# Patient Record
Sex: MALE | Race: WHITE
[De-identification: names, ages, dates, MRNs, and addresses within clinical notes are randomized per-mention and may not be internally consistent; named-entity substitution may affect disease eponyms.]

---

## 2018-06-29 ENCOUNTER — HOSPITAL ENCOUNTER (OUTPATIENT)
Dept: HOSPITAL 89 - LAB | Age: 78
End: 2018-06-29
Attending: INTERNAL MEDICINE
Payer: MEDICARE

## 2018-06-29 VITALS — BODY MASS INDEX: 38.1 KG/M2

## 2018-06-29 DIAGNOSIS — I10: Primary | ICD-10-CM

## 2018-06-29 DIAGNOSIS — E11.40: ICD-10-CM

## 2018-06-29 DIAGNOSIS — E78.2: ICD-10-CM

## 2018-06-29 LAB — LDLC SERPL-MCNC: 25 MG/DL

## 2018-06-29 PROCEDURE — 82947 ASSAY GLUCOSE BLOOD QUANT: CPT

## 2018-06-29 PROCEDURE — 82565 ASSAY OF CREATININE: CPT

## 2018-06-29 PROCEDURE — 82465 ASSAY BLD/SERUM CHOLESTEROL: CPT

## 2018-06-29 PROCEDURE — 84155 ASSAY OF PROTEIN SERUM: CPT

## 2018-06-29 PROCEDURE — 82374 ASSAY BLOOD CARBON DIOXIDE: CPT

## 2018-06-29 PROCEDURE — 82040 ASSAY OF SERUM ALBUMIN: CPT

## 2018-06-29 PROCEDURE — 84075 ASSAY ALKALINE PHOSPHATASE: CPT

## 2018-06-29 PROCEDURE — 84295 ASSAY OF SERUM SODIUM: CPT

## 2018-06-29 PROCEDURE — 83036 HEMOGLOBIN GLYCOSYLATED A1C: CPT

## 2018-06-29 PROCEDURE — 82435 ASSAY OF BLOOD CHLORIDE: CPT

## 2018-06-29 PROCEDURE — 84520 ASSAY OF UREA NITROGEN: CPT

## 2018-06-29 PROCEDURE — 84460 ALANINE AMINO (ALT) (SGPT): CPT

## 2018-06-29 PROCEDURE — 36415 COLL VENOUS BLD VENIPUNCTURE: CPT

## 2018-06-29 PROCEDURE — 82310 ASSAY OF CALCIUM: CPT

## 2018-06-29 PROCEDURE — 84478 ASSAY OF TRIGLYCERIDES: CPT

## 2018-06-29 PROCEDURE — 84450 TRANSFERASE (AST) (SGOT): CPT

## 2018-06-29 PROCEDURE — 84132 ASSAY OF SERUM POTASSIUM: CPT

## 2018-06-29 PROCEDURE — 82247 BILIRUBIN TOTAL: CPT

## 2018-06-29 PROCEDURE — 83718 ASSAY OF LIPOPROTEIN: CPT

## 2018-09-06 ENCOUNTER — HOSPITAL ENCOUNTER (OUTPATIENT)
Dept: HOSPITAL 89 - LAB | Age: 78
End: 2018-09-06
Attending: INTERNAL MEDICINE
Payer: MEDICARE

## 2018-09-06 ENCOUNTER — HOSPITAL ENCOUNTER (OUTPATIENT)
Dept: HOSPITAL 89 - LAB | Age: 78
End: 2018-09-06
Attending: NURSE PRACTITIONER
Payer: MEDICARE

## 2018-09-06 VITALS — BODY MASS INDEX: 38.1 KG/M2 | BODY MASS INDEX: 38.1 KG/M2

## 2018-09-06 DIAGNOSIS — R06.01: Primary | ICD-10-CM

## 2018-09-06 DIAGNOSIS — R79.89: ICD-10-CM

## 2018-09-06 DIAGNOSIS — Z79.899: ICD-10-CM

## 2018-09-06 DIAGNOSIS — R06.02: ICD-10-CM

## 2018-09-06 DIAGNOSIS — M05.79: Primary | ICD-10-CM

## 2018-09-06 LAB — PLATELET COUNT, AUTOMATED: 133 K/UL (ref 150–450)

## 2018-09-06 PROCEDURE — 84155 ASSAY OF PROTEIN SERUM: CPT

## 2018-09-06 PROCEDURE — 84520 ASSAY OF UREA NITROGEN: CPT

## 2018-09-06 PROCEDURE — 82565 ASSAY OF CREATININE: CPT

## 2018-09-06 PROCEDURE — 84295 ASSAY OF SERUM SODIUM: CPT

## 2018-09-06 PROCEDURE — 82947 ASSAY GLUCOSE BLOOD QUANT: CPT

## 2018-09-06 PROCEDURE — 82374 ASSAY BLOOD CARBON DIOXIDE: CPT

## 2018-09-06 PROCEDURE — 82247 BILIRUBIN TOTAL: CPT

## 2018-09-06 PROCEDURE — 82310 ASSAY OF CALCIUM: CPT

## 2018-09-06 PROCEDURE — 85025 COMPLETE CBC W/AUTO DIFF WBC: CPT

## 2018-09-06 PROCEDURE — 83880 ASSAY OF NATRIURETIC PEPTIDE: CPT

## 2018-09-06 PROCEDURE — 85651 RBC SED RATE NONAUTOMATED: CPT

## 2018-09-06 PROCEDURE — 82248 BILIRUBIN DIRECT: CPT

## 2018-09-06 PROCEDURE — 36415 COLL VENOUS BLD VENIPUNCTURE: CPT

## 2018-09-06 PROCEDURE — 84450 TRANSFERASE (AST) (SGOT): CPT

## 2018-09-06 PROCEDURE — 84075 ASSAY ALKALINE PHOSPHATASE: CPT

## 2018-09-06 PROCEDURE — 84132 ASSAY OF SERUM POTASSIUM: CPT

## 2018-09-06 PROCEDURE — 84460 ALANINE AMINO (ALT) (SGPT): CPT

## 2018-09-06 PROCEDURE — 82040 ASSAY OF SERUM ALBUMIN: CPT

## 2018-09-06 PROCEDURE — 82435 ASSAY OF BLOOD CHLORIDE: CPT

## 2018-10-16 ENCOUNTER — HOSPITAL ENCOUNTER (OUTPATIENT)
Dept: HOSPITAL 89 - NUC | Age: 78
End: 2018-10-16
Attending: INTERNAL MEDICINE
Payer: MEDICARE

## 2018-10-16 VITALS — BODY MASS INDEX: 38.1 KG/M2

## 2018-10-16 DIAGNOSIS — I25.810: Primary | ICD-10-CM

## 2018-10-16 PROCEDURE — 78452 HT MUSCLE IMAGE SPECT MULT: CPT

## 2018-10-16 PROCEDURE — 93017 CV STRESS TEST TRACING ONLY: CPT

## 2018-10-16 NOTE — RT STRESS TEST REPORT
FACILITY: [facility] 

 

PATIENT NAME: JACOB GRACIA

: [id number]

MR: [id number]

V: [id number]

EXAM DATE: 

ORDERING PHYSICIAN: NO OTHER

TECHNOLOGIST: Paulina

 

Acquisition Time: 2018-10-16  14:38:34

Total Exercise Time: 00:01:00

Test Indications: Screening for CAD

Medications: Seec nuc med sheet 

              

              

              

              

              

              

              

              

              

Protocol: LEXISCAN        

Max HR: 085 BPM  59% of  Pred: 144 BPM

Max BP: 138/058 mmHG

Max Work Load: 1.0 METS

 

 

Confirmed by COLLIN CLIFFORD ([id number]) on 10/16/2018 10:18:53 PM

 

Referred By:             Overread By: COLLIN CLIFFORD

## 2018-10-17 NOTE — RADIOLOGY IMAGING REPORT
FACILITY: [facility] 

 

PATIENT NAME: Krystian Franklin

: 1940

MR: [id number]

V: [id number]

EXAM DATE: 

ORDERING PHYSICIAN: LINA CALHOUN

TECHNOLOGIST: 

 

[place] [facility] [place]

Patient: Krystian Franklin

: 1940

MRN: [id number]

Visit/Account:[id number]

Date of Sevice: 10/16/2018

 

ACCESSION #: [id number]

 

EXAMINATION:  Single isotope SPECT imaging with regadenoson infusion and gated SPECT imaging.

 

DATE OF EXAMINATION:  10/16/2018.

 

DATE OF INTERPRETATION:  10/17/2018.

 

REQUESTING PHYSICIAN:  LINA CALHOUN.

 

INDICATION:  The patient is a 77-year-old male evaluated for CAD.

 

PROCEDURE:  After informed consent the patient received an intravenous injection of 12.8 mCi of Tc-99
m sestamibi followed at an appropriate time interval by rest imaging.  The patient then subsequently 
received an intravenous infusion of 0.4 mg of regadenoson per protocol without complication.  Resting
 heart rate was 73 bpm with a peak heart rate of 85 bpm.  Blood pressure at rest was 135 / 74 and fol
lowing infusion was 138 / 58.  Baseline EKG demonstrates sinus rhythm with PVCs.  There were no EKG c
hanges of ischemia following infusion.  Symptoms were nonspecific.  The patient then received an intr
avenous injection of 31.0 mCi of Tc-99m sestamibi followed by stress imaging.

 

RAW DATA:  Examination of the summed raw data revealed a good quality study.

 

MYOCARDIAL PERFUSION:  The tomographic images demonstrate normal myocardial perfusion with no evidenc
e of infarct or ischemia. There is no TID.

 

GATED IMAGES:  The gated images demonstrate mildly diminished ejection fraction 47%.

 

IMPRESSION:

1.  Nondiagnostic Lexiscan stress ECG

2.  Normal myocardial perfusion scan.

3.  Mildly decreased LV systolic function; LVEF 47%.

4. Based on the results of this exam, the patient appears to be at low risk for future cardiovascular
 events.

 

Report Dictated By: Mark Renteria at 10/17/2018 12:38 PM

 

Report E-Signed By: Mark Renteria  at 10/17/2018 12:41 PM

 

WSN:[id number]

## 2019-01-10 ENCOUNTER — HOSPITAL ENCOUNTER (OUTPATIENT)
Dept: HOSPITAL 89 - LAB | Age: 79
End: 2019-01-10
Attending: INTERNAL MEDICINE
Payer: MEDICARE

## 2019-01-10 VITALS — BODY MASS INDEX: 38.1 KG/M2

## 2019-01-10 DIAGNOSIS — E11.40: Primary | ICD-10-CM

## 2019-01-10 DIAGNOSIS — R06.02: ICD-10-CM

## 2019-01-10 DIAGNOSIS — R60.0: ICD-10-CM

## 2019-01-10 DIAGNOSIS — I10: ICD-10-CM

## 2019-01-10 DIAGNOSIS — E78.2: ICD-10-CM

## 2019-01-10 DIAGNOSIS — Z95.1: ICD-10-CM

## 2019-01-10 LAB — LDLC SERPL-MCNC: 8 MG/DL

## 2019-01-10 PROCEDURE — 82374 ASSAY BLOOD CARBON DIOXIDE: CPT

## 2019-01-10 PROCEDURE — 36415 COLL VENOUS BLD VENIPUNCTURE: CPT

## 2019-01-10 PROCEDURE — 84155 ASSAY OF PROTEIN SERUM: CPT

## 2019-01-10 PROCEDURE — 83880 ASSAY OF NATRIURETIC PEPTIDE: CPT

## 2019-01-10 PROCEDURE — 82947 ASSAY GLUCOSE BLOOD QUANT: CPT

## 2019-01-10 PROCEDURE — 84478 ASSAY OF TRIGLYCERIDES: CPT

## 2019-01-10 PROCEDURE — 82310 ASSAY OF CALCIUM: CPT

## 2019-01-10 PROCEDURE — 84132 ASSAY OF SERUM POTASSIUM: CPT

## 2019-01-10 PROCEDURE — 84450 TRANSFERASE (AST) (SGOT): CPT

## 2019-01-10 PROCEDURE — 82247 BILIRUBIN TOTAL: CPT

## 2019-01-10 PROCEDURE — 82465 ASSAY BLD/SERUM CHOLESTEROL: CPT

## 2019-01-10 PROCEDURE — 82565 ASSAY OF CREATININE: CPT

## 2019-01-10 PROCEDURE — 83718 ASSAY OF LIPOPROTEIN: CPT

## 2019-01-10 PROCEDURE — 82435 ASSAY OF BLOOD CHLORIDE: CPT

## 2019-01-10 PROCEDURE — 84460 ALANINE AMINO (ALT) (SGPT): CPT

## 2019-01-10 PROCEDURE — 82040 ASSAY OF SERUM ALBUMIN: CPT

## 2019-01-10 PROCEDURE — 84075 ASSAY ALKALINE PHOSPHATASE: CPT

## 2019-01-10 PROCEDURE — 84295 ASSAY OF SERUM SODIUM: CPT

## 2019-01-10 PROCEDURE — 83036 HEMOGLOBIN GLYCOSYLATED A1C: CPT

## 2019-01-10 PROCEDURE — 84520 ASSAY OF UREA NITROGEN: CPT

## 2019-02-12 ENCOUNTER — HOSPITAL ENCOUNTER (EMERGENCY)
Dept: HOSPITAL 89 - ER | Age: 79
LOS: 1 days | Discharge: ANOTHER HOSPITAL | End: 2019-02-13
Payer: MEDICARE

## 2019-02-12 ENCOUNTER — HOSPITAL ENCOUNTER (OUTPATIENT)
Dept: HOSPITAL 89 - AMB | Age: 79
End: 2019-02-12
Payer: MEDICARE

## 2019-02-12 VITALS — WEIGHT: 265 LBS | BODY MASS INDEX: 38.1 KG/M2

## 2019-02-12 VITALS — BODY MASS INDEX: 38.1 KG/M2

## 2019-02-12 DIAGNOSIS — M79.661: ICD-10-CM

## 2019-02-12 DIAGNOSIS — R10.30: ICD-10-CM

## 2019-02-12 DIAGNOSIS — M79.662: ICD-10-CM

## 2019-02-12 DIAGNOSIS — R53.1: ICD-10-CM

## 2019-02-12 DIAGNOSIS — E11.40: ICD-10-CM

## 2019-02-12 DIAGNOSIS — K63.89: Primary | ICD-10-CM

## 2019-02-12 DIAGNOSIS — R60.9: ICD-10-CM

## 2019-02-12 DIAGNOSIS — K92.2: Primary | ICD-10-CM

## 2019-02-12 DIAGNOSIS — I10: ICD-10-CM

## 2019-02-12 DIAGNOSIS — Z79.4: ICD-10-CM

## 2019-02-12 LAB — PLATELET COUNT, AUTOMATED: 171 K/UL (ref 150–450)

## 2019-02-12 PROCEDURE — 84450 TRANSFERASE (AST) (SGOT): CPT

## 2019-02-12 PROCEDURE — 82435 ASSAY OF BLOOD CHLORIDE: CPT

## 2019-02-12 PROCEDURE — 99291 CRITICAL CARE FIRST HOUR: CPT

## 2019-02-12 PROCEDURE — 82040 ASSAY OF SERUM ALBUMIN: CPT

## 2019-02-12 PROCEDURE — 84520 ASSAY OF UREA NITROGEN: CPT

## 2019-02-12 PROCEDURE — 85025 COMPLETE CBC W/AUTO DIFF WBC: CPT

## 2019-02-12 PROCEDURE — 84132 ASSAY OF SERUM POTASSIUM: CPT

## 2019-02-12 PROCEDURE — 83690 ASSAY OF LIPASE: CPT

## 2019-02-12 PROCEDURE — 83605 ASSAY OF LACTIC ACID: CPT

## 2019-02-12 PROCEDURE — 84075 ASSAY ALKALINE PHOSPHATASE: CPT

## 2019-02-12 PROCEDURE — 82150 ASSAY OF AMYLASE: CPT

## 2019-02-12 PROCEDURE — 74177 CT ABD & PELVIS W/CONTRAST: CPT

## 2019-02-12 PROCEDURE — 96361 HYDRATE IV INFUSION ADD-ON: CPT

## 2019-02-12 PROCEDURE — 84460 ALANINE AMINO (ALT) (SGPT): CPT

## 2019-02-12 PROCEDURE — 84155 ASSAY OF PROTEIN SERUM: CPT

## 2019-02-12 PROCEDURE — 82565 ASSAY OF CREATININE: CPT

## 2019-02-12 PROCEDURE — 82947 ASSAY GLUCOSE BLOOD QUANT: CPT

## 2019-02-12 PROCEDURE — 96375 TX/PRO/DX INJ NEW DRUG ADDON: CPT

## 2019-02-12 PROCEDURE — 82374 ASSAY BLOOD CARBON DIOXIDE: CPT

## 2019-02-12 PROCEDURE — 96374 THER/PROPH/DIAG INJ IV PUSH: CPT

## 2019-02-12 PROCEDURE — 71045 X-RAY EXAM CHEST 1 VIEW: CPT

## 2019-02-12 PROCEDURE — 82310 ASSAY OF CALCIUM: CPT

## 2019-02-12 PROCEDURE — 82247 BILIRUBIN TOTAL: CPT

## 2019-02-12 PROCEDURE — 81001 URINALYSIS AUTO W/SCOPE: CPT

## 2019-02-12 PROCEDURE — 84295 ASSAY OF SERUM SODIUM: CPT

## 2019-02-12 NOTE — RADIOLOGY IMAGING REPORT
FACILITY: [facility] 

 

PATIENT NAME: Krystian Franklin

: 1940

MR: [id number]

V: [id number]

EXAM DATE: 

ORDERING PHYSICIAN: VINOD LEMA

TECHNOLOGIST: 

 

Location: [facility] [place]

Patient: Krystian Franklin

: 1940

MRN: [id number]

Visit/Account:[id number]

Date of Sevice:  2019

 

ACCESSION #: [id number]

 

 

COMPUTED TOMOGRAPHY ABDOMEN AND PELVIS WITH INTRAVENOUS CONTRAST

 

DATE OF EXAM: 2019 8:22 PM

 

INDICATION: Severe abdominal pain and bloating.

 

COMPARISON: 2017 and previous.

 

TECHNIQUE: Contrast enhanced abdomen and pelvis CT performed during the injection of 95 ml of Isovue 
300.  Sagittal and coronal reconstructions were performed.  One of the following dose optimization te
chniques was utilized in the performance of this exam: Automated exposure control; adjustment of the 
mA and/or kV according to the patient's size; or use of an iterative  reconstruction technique.  Spec
[facility] details can be referenced in the facility's radiology CT exam operational policy.

 

FINDINGS:

 

Lung bases: Mild scarring/atelectasis.  Coronary artery calcification.

 

Liver and hepatic vasculature:  There is a small amount of scattered gas within the liver likely in t
he portal venous system.  Calcified granuloma noted in the right lobe.

 

Gallbladder and bile ducts:  Cholecystectomy.  Ducts are unremarkable.

 

Spleen:  Multiple calcified granulomas.

 

Pancreas:  Vague hypoattenuating region in the tail of the pancreas is similar in appearance 2016, po
ssibly representing underlying cyst.

 

Adrenals:  Normal.

 

Kidneys, ureters and bladder:  Nonacute.  Probable tiny left renal cysts.

 

Retroperitoneum and aorta:  Nonaneurysmal aorta with mild to moderate calcification.  No adenopathy.

 

GI tract, mesentery and peritoneum:  The gastric pylorus and proximal duodenum appear thick-walled.  
There is extensive pneumatosis involving the cecum and proximal ascending colon with a small volume o
f adjacent pneumoperitoneum.  There is also a small volume of pneumoperitoneum in the upper abdomen.

 

There is a small bowel anastomosis in the right lower quadrant that is partially contained within a s
mall right lower quadrant hernia.  The portion associated with the hernia also appears to have some p
neumatosis.  There is mild gaseous distention of the remainder of the colon.  No definite evidence of
 obstruction.  Additional loops of small bowel appear to be adherent to the anterior abdominal wall s
ubjacent to ventral scar.

 

Prostate and seminal vesicles: Normal.

 

Bones and soft tissues:  Ventral surgical scars.  There are at least 2 fat-containing ventral hernias
 in the upper abdomen 2.  Small right lower quadrant hernia partially containing a small bowel anasto
mosis as above.  No suspicious or acute bone lesion.  Remote compression deformity of L2.

 

 

IMPRESSION:

 

1.  Extensive pneumatosis of the cecum and proximal ascending colon, with a small volume of pneumoper
itoneum and portal venous gas.  Although of uncertain etiology, this could indicate ischemic or necro
tic bowel, possibly with a microperforation.  Recommend urgent surgical consultation.

 

2.  Small bowel anastomosis in the right lower quadrant partially contained within a small abdominal 
wall hernia.  The wall of the anastomosis involved with the hernia appears to have some pneumatosis a
s well.

 

3.  Gastric pylorus proximal duodenum appear thick-walled which may be infectious or inflammatory; ne
oplasm not excluded.  This could be further evaluated with endoscopy if indicated.

 

4.  Hypoattenuating lesion in the tail of the pancreas is similar in appearance 2016, possibly repres
enting underlying cyst.  Consider 1-2 year follow-up.

 

Dr. Solano discussed this case with VINOD LEMA on 2019 10:02 PM.

 

Report Dictated By: Yusef Solano MD at 2019 9:42 PM

 

Report E-Signed By: Yusef Solano MD  at 2019 10:02 PM

 

WSN:[id number]

## 2019-02-12 NOTE — ER REPORT
History and Physical


Time Seen By MD:  20:11


HPI/ROS


CHIEF COMPLAINT: Abdominal pain and bloating





HISTORY OF PRESENT ILLNESS: 78-year-old male presents ambulatory to the ER 


complaining of increased abdominal pain for 2 days.  Normally has chronic 


diarrhea and abdominal bloating.  He's had numerous abdominal surgeries.  The 


patient feels like he is going to explode.  He notes increased abdominal 


distention and nausea for the last 2 days.  She notes no fever or chills.  


Patient notes no vomiting.  He notes his ventral hernia seems firmer than usual 


and enlarged





REVIEW OF SYSTEMS:


Respiratory: No cough, no dyspnea.


Cardiovascular: No chest pain, no palpitations.


Gastrointestinal: As above


Musculoskeletal: No back pain.


Allergies:  


Coded Allergies:  


     Penicillins (Verified  Allergy, Severe, HIVES, 11/13/16)


     Quinolones (Verified  Allergy, Severe, TENDONITIS, 11/13/16)


     levofloxacin (Verified  Allergy, Severe,  , 11/13/16)


   


   TENDONITIS


     adhesive (Verified  Allergy, Mild, RASH, 11/13/16)


     cefaclor (Verified  Adverse Reaction, Mild, ITCHING, 11/13/16)


     morphine (Verified  Adverse Reaction, Mild, SEDATION, 11/13/16)


Home Meds


Active Scripts


Albuterol Sulfate (VENTOLIN HFA) 18 Gm Inh, 2 PUFF INH Q4-6H PRN for shortness 


of breath, #3 INH 3 Refills


   Prov:HUBER SWANSON MD         1/10/19


Albuterol Sulfate 0.083% (ALBUTEROL SULFATE 0.083%) 2.5 Mg/3 Ml Vial.neb, 2.5 MG


INH Q6H for wheezing/cough, #1 BOX 3 Refills


   Prov:HUBER SWANSON MD         12/26/18


Mometasone Furoate (NASONEX) 17 Gm [place], 2 SPR MADI QDAY, #1 SPRAY 3 Refills


   Prov:HUBER SWANSON MD         12/20/18


Pantoprazole Sodium (PANTOPRAZOLE SODIUM) 40 Mg Tablet.dr, 1 TAB PO QDAY, #90 


TAB.SR 1 Refill


   Prov:HUBER SWANSON MD         12/20/18


Lisinopril (LISINOPRIL) 5 Mg Tablet, 1 TAB PO BID, #90 TAB 3 Refills


   Prov:HUBER SWANSON MD         12/20/18


Blood Sugar Diagnostic, Drum (Accu-Chek Compact Plus) 1 Each Strip, 300 EA MC 


every ninety days, #300 EA 4 Refills


   Use to test blood suger TID


   Prov:HUBER SWANSON MD         11/1/18


Clobetasol Propionate (CLOBETASOL PROPIONATE) 15 Gm Oint...g., 1 SINDHU TP BID, #1 


TUBE 0 Refills


   Prov:HUBER SWANSON MD         9/13/18


Glipizide (GLIPIZIDE) 10 Mg Tablet, 1 TAB PO BID, #180 TAB 3 Refills


   Prov:HUBER SWANSON MD         9/7/18


Nph, Human Insulin Isophane (NOVOLIN N) 100 Unit/1 Ml Vial, 23 UNIT SQ BIDAC, #2


VIAL 5 Refills


   Prov:HUBER SWANSON MD         7/10/18


Leflunomide (LEFLUNOMIDE) 20 Mg Tablet, 1 TAB PO QDAY, #90 TAB 1 Refill


   Prov:CORY ARREDONDO MD         3/14/18


Metoprolol Succinate (METOPROLOL SUCCINATE) 25 Mg Tab.er.24h, 0.5 TAB PO BID, 


#90 TAB 4 Refills


   Prov:HUBER SWANSON MD         11/29/17


Syringe & Needle,Insulin,1 Ml (INSULIN SYRINGE) 1 Each Disp.syrin, 1 EACH MC 


[id number], #300 4 Refills


   use 3 syringes to inject insulin 3 times daily.


   Prov:HUBER SWANSON MD         11/29/17


Montelukast Sodium (SINGULAIR) 10 Mg Tablet, 1 TAB PO QDAY, #30 TAB 5 Refills


   Prov:HUBER SWANSON MD         5/31/17


Reported Medications


Loperamide Hcl (LOPERAMIDE) 2 Mg Tablet, 2 MG PO


   2/12/19


Fluticasone Prop 50 Mcg Ns (FLONASE 50 MCG NS) 16 Gm Spray.susp, 2 SPRAYS NS 


QDAY, BOT


   2/12/19


Prednisone (PREDNISONE) 20 Mg Tablet, 20 MG PO BID, TAB


   2/12/19


Cholecalciferol (Vitamin D3) (VITAMIN D3) 1,000 Unit Tablet, 1000 UNIT PO, TAB


   2/12/19


Potassium Chloride (KLOR-CON) 20 Meq Packet, 20 MEQ PO QDAY, PACKET


   2/12/19


Omeprazole (OMEPRAZOLE) 20 Mg Capsule.dr, 1 CAP PO BID, CAP


   2/12/19


Atorvastatin Calcium (LIPITOR) 40 Mg Tablet, 1 TAB PO QDAY, TAB


   2/12/19


Diphenhydramine Hcl (BENADRYL) 25 Mg Capsule, 1 CAP PO HS PRN for allergies, 


CAPSULE


   6/28/18


Oxygen (OXYGEN)  Inha, 2 L INH HS, L


   6/28/18


Ubidecarenone (CO Q-10) 400 Mg Capsule, 1 CAP PO HS, CAPSULE


   3/20/17


Cholecalciferol (Vitamin D3) (VITAMIN D) 2,000 Unit Tablet, 1 TAB PO QDAY, 


CAPSULE


   1/25/17


Aspirin (ASPIRIN) 81 Mg Tab.chew, 1 TAB PO QDAY, TAB.CHEW


   11/17/16


Magnesium Oxide (MAGNESIUM) 400 Mg Capsule, 1 CAP PO QDAY, CAPSULE


   7/30/15


Discontinued Reported Medications


Atorvastatin Calcium (ATORVASTATIN CALCIUM) 20 Mg Tablet, 1 TAB PO QDAY, TAB


   1/10/19


Prednisone (PREDNISONE) 1 Mg Tab, 2 TAB PO BID, TAB


   5/31/17


Discontinued Scripts


Potassium Chloride (POTASSIUM CHLORIDE) 20 Meq Tab.er.prt, 1 TAB PO BID, #180 


TAB 4 Refills


   Prov:HUBER SWANSON MD         11/29/17


Past Medical/Surgical History


Past Medical History








Neurologic:   Reports hx of: neuropathy (legs feel cold. gabapentin prn. )





  other neurologic history (bilateral ulnar neuropathy with hand numbness.)














HEENT:   Reports hx of: tinnitus














Cardiovascular:   Reports hx of: atrial fibrillation (Paroxysmal, Ectopic Beats.


)





  coronary artery disease (MI 1992. 1 stent in 2009 and 1 in 2010 per Dr. Appiah. triple bypass 97.)





  DVT (RLE, post-op infected knee 7/16)





  edema (some in right leg since 2000 when had peritonitis. tx with avelox. hurt


gerri)





  hyperlipidemia (mixed)





  hypertension














Respiratory:   Reports hx of: sleep apnea (using O2 at night but not CPAP)





  other respiratory history (peak flow 500 in 2/15. )














Gastrointestinal:   Reports hx of: GERD





  irritable bowel syndrome





  peptic ulcer disease (Tx for H. pylori about 2005. Had EGD. )














Genitourinary:   Reports hx of: pyelonephritis (7/16)





  urinary tract infection (Hx recurrence, with bacteremia 12/28/14)














Musculoskeletal:   Reports hx of: osteoarthritis (severe in knees. may need 


total knees. )





  rheumatoid arthritis (Saw Dr. Zhanna barker for years, MTX led to UTI and high


LFTs after 6 m. )





  other musculoskeletal hx (also saw rheum in [place] [facility]. 


prednisone prn. Nl. urate 2011.)














Psychiatric:   Reports hx of: anxiety (1/15. stress from sons illness. )














Endocrine:   Reports hx of: diabetes type 2 (Dx about 97. started insulin about 


2012. diarrhea with metformin. )





  obesity














Infectious disease:   Reports hx of: other infectious disease (12/28/14 E. coli 


UTI and bacteremia)














Events:   REPORTS HX OF: Other events (Dr. Anthony before. Initial visit in 2014.


)











Past Surgical History








Cardiovascular:   Reports hx of: CABG surgery (X 3 in '97 per Dr. Reyes )





  coronary stent (X 2, )








Additional Cardio surgeries


Oxygen 1 liter np around the clock








Gastrointestinal:   Reports hx of: appendectomy (1984. developed incisional 


hernia afterward. )





  cholecystectomy (1980s. )





  hernia repair (X 5 abdominal hernias due to surgeries. )





  other GI surgery (2000, small bowel obstruction, hospitalized x  3 months)














Integumentary:   Reports hx of: skin cancer removal (BCC upper lip, 2011)








Reviewed Nurses Notes:  Yes


Old Medical Records Reviewed:  Yes


Hx Smoking:  Yes (QUIT 1995)


Smoking Status:  Former Smoker


Exposure to Second Hand Smoke?:  No


Hx Substance Use Disorder:  No


Hx Alcohol Use:  No


Constitutional





Vital Sign - Last 24 Hours








 2/12/19 2/12/19 2/12/19 2/12/19





 20:13 20:15 20:31 20:38


 


Temp 98.3   


 


Pulse 48   55


 


Resp 18   


 


B/P (MAP) 139/63 139/63 (88) 141/60 (87) 


 


Pulse Ox 84   96


 


O2 Delivery Room Air   


 


    





 2/12/19 2/12/19 2/12/19 2/12/19





 21:05 21:08 21:13 21:30


 


Pulse  59 55 


 


B/P (MAP) 149/108 (122)   ???/??? (1665)


 


Pulse Ox  94 96 





 2/12/19 2/12/19 2/12/19 2/12/19





 21:43 21:54 22:00 22:13


 


Pulse ???   ???


 


B/P (MAP)  142/84 (103) 142/79 (100) 





 2/12/19 2/12/19 2/12/19 2/12/19





 22:30 22:43 23:00 23:00


 


Pulse  73  63


 


B/P (MAP) 152/99 (116)  145/81 (102) 145/81 (102)


 


Pulse Ox  95  94





 2/12/19 2/13/19 2/13/19 2/13/19





 23:30 00:00 00:25 00:30


 


Pulse 59 77  


 


Resp 21 14  


 


B/P (MAP) 120/83 (95) 178/108 (131) 94/84 (87) 163/149 (154)


 


Pulse Ox 97 97  





 2/13/19   





 00:33   


 


B/P (MAP) 158/87 (110)   














Intake and Output   


 


 2/12/19 2/12/19 2/13/19





 15:00 23:00 07:00


 


Intake Total  1000 ml 284 ml


 


Balance  1000 ml 284 ml








Physical Exam


  General Appearance: The patient is alert, has no immediate need for airway 


protection and no current signs of toxicity..  Vital signs stable, afebrile, 


pulse ox normal, slightly pale appearing, skin warm and dry


HEENT: Pupils equal and round no injection.  TMs normal, oropharynx are redness 


or exudate


Respiratory: Chest is non tender, lungs are clear to auscultation.


Cardiac: regular rate and rhythm


Gastrointestinal: Abdomen is soft, mild distention, mild tenderness, no masses, 


bowel sounds normal.


Musculoskeletal:  Neck: Neck is supple and non tender.


Extremities have full range of motion and are non tender.


Skin: No rashes or lesions.





DIFFERENTIAL DIAGNOSIS: After history and physical exam differential diagnosis 


was considered for abdominal pain including but not limited to appendicitis, 


cholecystitis, gastritis, bowel obstruction, and urinary tract infection.





Medical Decision Making


Data Points


Result Diagram:  


2/12/19 2025 2/12/19 2025





Laboratory





Hematology








Test


 2/12/19


20:25 2/12/19


22:24


 


Red Blood Count


 5.73 M/uL


(4.00-5.60) 





 


Mean Corpuscular Volume


 90.5 fL


(80.0-96.0) 





 


Mean Corpuscular Hemoglobin


 29.0 pg


(26.0-33.0) 





 


Mean Corpuscular Hemoglobin


Concent 32.0 g/dL


(32.0-36.0) 





 


Red Cell Distribution Width


 16.5 %


(11.5-14.5) 





 


Mean Platelet Volume


 10.7 fL


(7.2-11.1) 





 


Neutrophils (%) (Auto)


 78.0 %


(39.4-72.5) 





 


Lymphocytes (%) (Auto)


 9.8 %


(17.6-49.6) 





 


Monocytes (%) (Auto)


 7.5 %


(4.1-12.4) 





 


Eosinophils (%) (Auto)


 4.1 %


(0.4-6.7) 





 


Basophils (%) (Auto)


 0.6 %


(0.3-1.4) 





 


Nucleated RBC Relative Count


(auto) 0.0 /100WBC 


 





 


Neutrophils # (Auto)


 9.2 K/uL


(2.0-7.4) 





 


Lymphocytes # (Auto)


 1.2 K/uL


(1.3-3.6) 





 


Monocytes # (Auto)


 0.9 K/uL


(0.3-1.0) 





 


Eosinophils # (Auto)


 0.5 K/uL


(0.0-0.5) 





 


Basophils # (Auto)


 0.1 K/uL


(0.0-0.1) 





 


Nucleated RBC Absolute Count


(auto) 0.00 K/uL 


 





 


Sodium Level


 139 mmol/L


(137-145) 





 


Potassium Level


 3.9 mmol/L


(3.5-5.0) 





 


Chloride Level


 105 mmol/L


() 





 


Carbon Dioxide Level


 28 mmol/L


(22-30) 





 


Blood Urea Nitrogen


 13 mg/dl


(9-21) 





 


Creatinine


 0.60 mg/dl


(0.66-1.25) 





 


Glomerular Filtration Rate


Calc > 60.0 


 





 


Random Glucose


 84 mg/dl


() 





 


Lactate


 1.1 mmol/L


(0.7-2.1) 





 


Calcium Level


 9.1 mg/dl


(8.4-10.2) 





 


Total Bilirubin


 1.1 mg/dl


(0.2-1.3) 





 


Aspartate Amino Transf


(AST/SGOT) 29 U/L (0-35) 


 





 


Alanine Aminotransferase


(ALT/SGPT) 41 U/L (0-56) 


 





 


Alkaline Phosphatase 59 U/L (0-126)  


 


Total Protein


 6.1 g/dl


(6.3-8.2) 





 


Albumin


 3.5 g/dl


(3.5-5.0) 





 


Amylase Level


 < 30 U/L


(0-110) 





 


Lipase


 37 U/L


() 





 


Urine Color  Yellow 


 


Urine Clarity  Clear 


 


Urine pH


 


 5.0 pH


(4.8-9.5)


 


Urine Specific Gravity  S3 


 


Urine Protein


 


 Negative mg/dL


(NEGATIVE)


 


Urine Glucose (UA)


 


 Negative mg/dL


(NEGATIVE)


 


Urine Ketones


 


 Negative mg/dL


(NEGATIVE)


 


Urine Blood


 


 Negative


(NEGATIVE)


 


Urine Nitrite


 


 Negative


(NEGATIVE)


 


Urine Bilirubin


 


 Negative


(NEGATIVE)


 


Urine Urobilinogen


 


 Negative mg/dL


(0.2-1.9)


 


Urine Leukocyte Esterase


 


 Negative


(NEGATIVE)


 


Urine RBC


 


 None /HPF


(0-2/HPF)


 


Urine WBC


 


 1 /HPF


(0-5/HPF)


 


Urine Squamous Epithelial


Cells 


 Moderate /LPF


(</=FEW)


 


Urine Bacteria


 


 Negative /HPF


(NONE-FEW)


 


Urine Mucus


 


 Few /HPF


(NONE-FEW)








Chemistry








Test


 2/12/19


20:25 2/12/19


22:24


 


White Blood Count


 11.8 k/uL


(4.5-11.0) 





 


Red Blood Count


 5.73 M/uL


(4.00-5.60) 





 


Hemoglobin


 16.6 g/dL


(14.0-18.0) 





 


Hematocrit


 51.9 %


(42.0-52.0) 





 


Mean Corpuscular Volume


 90.5 fL


(80.0-96.0) 





 


Mean Corpuscular Hemoglobin


 29.0 pg


(26.0-33.0) 





 


Mean Corpuscular Hemoglobin


Concent 32.0 g/dL


(32.0-36.0) 





 


Red Cell Distribution Width


 16.5 %


(11.5-14.5) 





 


Platelet Count


 171 K/uL


(150-450) 





 


Mean Platelet Volume


 10.7 fL


(7.2-11.1) 





 


Neutrophils (%) (Auto)


 78.0 %


(39.4-72.5) 





 


Lymphocytes (%) (Auto)


 9.8 %


(17.6-49.6) 





 


Monocytes (%) (Auto)


 7.5 %


(4.1-12.4) 





 


Eosinophils (%) (Auto)


 4.1 %


(0.4-6.7) 





 


Basophils (%) (Auto)


 0.6 %


(0.3-1.4) 





 


Nucleated RBC Relative Count


(auto) 0.0 /100WBC 


 





 


Neutrophils # (Auto)


 9.2 K/uL


(2.0-7.4) 





 


Lymphocytes # (Auto)


 1.2 K/uL


(1.3-3.6) 





 


Monocytes # (Auto)


 0.9 K/uL


(0.3-1.0) 





 


Eosinophils # (Auto)


 0.5 K/uL


(0.0-0.5) 





 


Basophils # (Auto)


 0.1 K/uL


(0.0-0.1) 





 


Nucleated RBC Absolute Count


(auto) 0.00 K/uL 


 





 


Glomerular Filtration Rate


Calc > 60.0 


 





 


Lactate


 1.1 mmol/L


(0.7-2.1) 





 


Calcium Level


 9.1 mg/dl


(8.4-10.2) 





 


Total Bilirubin


 1.1 mg/dl


(0.2-1.3) 





 


Aspartate Amino Transf


(AST/SGOT) 29 U/L (0-35) 


 





 


Alanine Aminotransferase


(ALT/SGPT) 41 U/L (0-56) 


 





 


Alkaline Phosphatase 59 U/L (0-126)  


 


Total Protein


 6.1 g/dl


(6.3-8.2) 





 


Albumin


 3.5 g/dl


(3.5-5.0) 





 


Amylase Level


 < 30 U/L


(0-110) 





 


Lipase


 37 U/L


() 





 


Urine Color  Yellow 


 


Urine Clarity  Clear 


 


Urine pH


 


 5.0 pH


(4.8-9.5)


 


Urine Specific Gravity  S3 


 


Urine Protein


 


 Negative mg/dL


(NEGATIVE)


 


Urine Glucose (UA)


 


 Negative mg/dL


(NEGATIVE)


 


Urine Ketones


 


 Negative mg/dL


(NEGATIVE)


 


Urine Blood


 


 Negative


(NEGATIVE)


 


Urine Nitrite


 


 Negative


(NEGATIVE)


 


Urine Bilirubin


 


 Negative


(NEGATIVE)


 


Urine Urobilinogen


 


 Negative mg/dL


(0.2-1.9)


 


Urine Leukocyte Esterase


 


 Negative


(NEGATIVE)


 


Urine RBC


 


 None /HPF


(0-2/HPF)


 


Urine WBC


 


 1 /HPF


(0-5/HPF)


 


Urine Squamous Epithelial


Cells 


 Moderate /LPF


(</=FEW)


 


Urine Bacteria


 


 Negative /HPF


(NONE-FEW)


 


Urine Mucus


 


 Few /HPF


(NONE-FEW)








Urinalysis








Test


 2/12/19


22:24


 


Urine Color Yellow 


 


Urine Clarity Clear 


 


Urine pH


 5.0 pH


(4.8-9.5)


 


Urine Specific Gravity S3 


 


Urine Protein


 Negative mg/dL


(NEGATIVE)


 


Urine Glucose (UA)


 Negative mg/dL


(NEGATIVE)


 


Urine Ketones


 Negative mg/dL


(NEGATIVE)


 


Urine Blood


 Negative


(NEGATIVE)


 


Urine Nitrite


 Negative


(NEGATIVE)


 


Urine Bilirubin


 Negative


(NEGATIVE)


 


Urine Urobilinogen


 Negative mg/dL


(0.2-1.9)


 


Urine Leukocyte Esterase


 Negative


(NEGATIVE)


 


Urine RBC


 None /HPF


(0-2/HPF)


 


Urine WBC


 1 /HPF


(0-5/HPF)


 


Urine Squamous Epithelial


Cells Moderate /LPF


(</=FEW)


 


Urine Bacteria


 Negative /HPF


(NONE-FEW)


 


Urine Mucus


 Few /HPF


(NONE-FEW)











EKG/Imaging


Imaging


Results:


CT scan of the abdomen and pelvis with IV contrast was obtained.  The results of


the study are 


COMPUTED TOMOGRAPHY ABDOMEN AND PELVIS WITH INTRAVENOUS CONTRAST


 


DATE OF EXAM: 2/12/2019 8:22 PM


 


INDICATION: Severe abdominal pain and bloating.


 


COMPARISON: 8/14/2017 and previous.


 


TECHNIQUE: Contrast enhanced abdomen and pelvis CT performed during the 


injection of 95 ml of Isovue 300.  Sagittal and coronal reconstructions were 


performed.  One of the following dose optimization techniques was utilized in 


the performance of this exam: Automated exposure control; adjustment of the mA 


and/or kV according to the patient's size; or use of an iterative  


reconstruction technique.  Specific details can be referenced in the facility's 


radiology CT exam operational policy.


 


FINDINGS:


 


Lung bases: Mild scarring/atelectasis.  Coronary artery calcification.


 


Liver and hepatic vasculature:  There is a small amount of scattered gas within 


the liver likely in the portal venous system.  Calcified granuloma noted in the 


right lobe.


 


Gallbladder and bile ducts:  Cholecystectomy.  Ducts are unremarkable.


 


Spleen:  Multiple calcified granulomas.


 


Pancreas:  Vague hypoattenuating region in the tail of the pancreas is similar 


in appearance 2016, possibly representing underlying cyst.


 


Adrenals:  Normal.


 


Kidneys, ureters and bladder:  Nonacute.  Probable tiny left renal cysts.


 


Retroperitoneum and aorta:  Nonaneurysmal aorta with mild to moderate 


calcification.  No adenopathy.


 


GI tract, mesentery and peritoneum:  The gastric pylorus and proximal duodenum 


appear thick-walled.  There is extensive pneumatosis involving the cecum and 


proximal ascending colon with a small volume of adjacent pneumoperitoneum.  


There is also a small volume of pneumoperitoneum in the upper abdomen.


 


There is a small bowel anastomosis in the right lower quadrant that is partially


contained within a small right lower quadrant hernia.  The portion associated 


with the hernia also appears to have some pneumatosis.  There is mild gaseous 


distention of the remainder of the colon.  No definite evidence of obstruction. 


Additional loops of small bowel appear to be adherent to the anterior abdominal 


wall subjacent to ventral scar.


 


Prostate and seminal vesicles: Normal.


 


Bones and soft tissues:  Ventral surgical scars.  There are at least 2 


fat-containing ventral hernias in the upper abdomen 2.  Small right lower 


quadrant hernia partially containing a small bowel anastomosis as above.  No 


suspicious or acute bone lesion.  Remote compression deformity of L2.


 


 


IMPRESSION:


 


1.  Extensive pneumatosis of the cecum and proximal ascending colon, with a 


small volume of pneumoperitoneum and portal venous gas.  Although of uncertain 


etiology, this could indicate ischemic or necrotic bowel, possibly with a 


microperforation.  Recommend urgent surgical consultation.


 


2.  Small bowel anastomosis in the right lower quadrant partially contained 


within a small abdominal wall hernia.  The wall of the anastomosis involved with


the hernia appears to have some pneumatosis as well.


 


3.  Gastric pylorus proximal duodenum appear thick-walled which may be in


fectious or inflammatory; neoplasm not excluded.  This could be further 


evaluated with endoscopy if indicated.


 


4.  Hypoattenuating lesion in the tail of the pancreas is similar in appearance 


2016, possibly representing underlying cyst.  Consider 1-2 year follow-up.








The study was read by the radiologist.  I viewed the images myself on the PACS 


system.





ED Course/Re-evaluation


Clinical Indication for ER IV:  Hydration, IV Access


ED Course


Patient was admitted to an examination room.  H&P was done.  The differential 


diagnoses was considered.  On clinical examination.  Patient has a distended 


abdomen is moderately tender.  He has some hernias that seem more distended than


usual.  None of them appear incarcerated this time.  Patient notes gross 


abdominal distention.  He feels like he is going to explode.  Diagnostic 


evaluation was undertaken.  Patient will receive a CT scan of the abdomen and 


pelvis with IV contrast.  It shows pneumatosis Rossy of the cecum and proximal 


ascending colon with a small volume of pneumoperitoneum and portal venous gas.  


Patient has a mildly elevated white blood cell count of 12,000 with 78 segs, no 


bands.  Patient shows no signs of sepsis.  His vital signs are stable.  Taste 


was discussed with general surgery, Dr. Ullrich at our facility, who advises 


admit an IV coverage.  He also recommends decompression.  He is unsure whether 


surgery is indicated at this point.  An NG tube was placed.  A chest x-ray was 


performed which poorly visualized.  The NG tube.  There were good air sounds in 


the gastric area on insufflation of air.








 02/12/2019 11:10:34 pm case discussed with Dr. Ruthie Gamez, general surgeon 


at [facility] who accepts the patient for transfer to her facility to the ER.


Decision to Disposition Date:  Feb 12, 2019


Decision to Disposition Time:  23:03


Critical Care Time


I spent a total of 60 minutes of critical care time in obtaining history, 


performing a physical exam, bedside monitoring of interventions, collecting and 


interpreting tests and discussion with consultants but not including time spent 


performing procedures.





Depart


Departure


Latest Vital Signs





Vital Signs








  Date Time  Temp Pulse Resp B/P (MAP) Pulse Ox O2 Delivery O2 Flow Rate FiO2


 


2/13/19 00:33    158/87 (110)    


 


2/13/19 00:00  77 14  97   


 


2/12/19 20:13 98.3     Room Air  








Impression:  


   Primary Impression:  


   Pneumatosis coli


   Additional Impressions:  


   Type 2 diabetes mellitus with diabetic neuropathy, with long-term current use


   of insulin


   Hx of CABG


   Hypertension, essential


Condition:  Improved


Disposition:  XFER TO [facility]UTE [facility]ARE [facility]OSP[facility]


Referrals:  


HUBER SWANSON MD (PCP)





Problem Qualifiers











VINOD LEMA DO              Feb 12, 2019 20:14

## 2019-02-13 VITALS — DIASTOLIC BLOOD PRESSURE: 87 MMHG | SYSTOLIC BLOOD PRESSURE: 158 MMHG

## 2019-02-13 NOTE — RADIOLOGY IMAGING REPORT
FACILITY: [facility] 

 

PATIENT NAME: Krystian Franklin

: 1940

MR: [id number]

V: [id number]

EXAM DATE: 

ORDERING PHYSICIAN: VINOD LEMA

TECHNOLOGIST: 

 

Location: [facility]

Patient: Krystian Franklin

: 1940

MRN: [id number]

Visit/Account:[id number]

Date of Sevice:  2019

 

ACCESSION #: [id number]

 

CHEST SINGLE AP 2019 23:11 hours.

 

HISTORY: NG tube placement.

 

COMPARISON: Earlier same day. Chest x-rays date to 10/3/2008.

 

TECHNIQUE: Portable AP view of the chest.

 

FINDINGS:

 

Tubes/lines/hardware: There is an NG tube. Due to patient body habitus, the tube is difficult to visu
brown, but appears to reach at least the upper stomach. There are sternal closure wires. There are ex
ternal chest leads.

 

Pulmonary/pleura: There is a linear opacities at the left base, unchanged, that may be scarring or at
electasis. Right lung is clear. There is no pneumothorax or pleural effusion.

 

Cardiomediastinal: Cardiac and mediastinal silhouettes are within normal limits.

 

Bones/soft tissues: No acute osseous abnormality. The visible abdomen is normal.

 

IMPRESSION:

1. Difficult to visualize the distal NG tube due to patient body habitus, but appears to reach the at
 least the upper stomach. An abdominal x-ray could be performed to confirm.

2. Stable appearance of the chest.

 

Report Dictated By: Mirtha Hernandez at 2019 12:27 AM

 

Report E-Signed By: Mirtha Hernandez  at 2019 12:32 AM

 

WSN:[id number]

## 2019-03-07 ENCOUNTER — HOSPITAL ENCOUNTER (OUTPATIENT)
Dept: HOSPITAL 89 - LAB | Age: 79
End: 2019-03-07
Attending: INTERNAL MEDICINE
Payer: MEDICARE

## 2019-03-07 VITALS — BODY MASS INDEX: 38.1 KG/M2

## 2019-03-07 DIAGNOSIS — E11.40: ICD-10-CM

## 2019-03-07 DIAGNOSIS — K63.89: ICD-10-CM

## 2019-03-07 DIAGNOSIS — I10: ICD-10-CM

## 2019-03-07 DIAGNOSIS — E78.2: ICD-10-CM

## 2019-03-07 DIAGNOSIS — I25.10: Primary | ICD-10-CM

## 2019-03-07 DIAGNOSIS — R60.0: ICD-10-CM

## 2019-03-07 LAB — PLATELET COUNT, AUTOMATED: 165 K/UL (ref 150–450)

## 2019-03-07 PROCEDURE — 82947 ASSAY GLUCOSE BLOOD QUANT: CPT

## 2019-03-07 PROCEDURE — 83735 ASSAY OF MAGNESIUM: CPT

## 2019-03-07 PROCEDURE — 82435 ASSAY OF BLOOD CHLORIDE: CPT

## 2019-03-07 PROCEDURE — 83036 HEMOGLOBIN GLYCOSYLATED A1C: CPT

## 2019-03-07 PROCEDURE — 84443 ASSAY THYROID STIM HORMONE: CPT

## 2019-03-07 PROCEDURE — 82374 ASSAY BLOOD CARBON DIOXIDE: CPT

## 2019-03-07 PROCEDURE — 84132 ASSAY OF SERUM POTASSIUM: CPT

## 2019-03-07 PROCEDURE — 84155 ASSAY OF PROTEIN SERUM: CPT

## 2019-03-07 PROCEDURE — 82040 ASSAY OF SERUM ALBUMIN: CPT

## 2019-03-07 PROCEDURE — 84450 TRANSFERASE (AST) (SGOT): CPT

## 2019-03-07 PROCEDURE — 82247 BILIRUBIN TOTAL: CPT

## 2019-03-07 PROCEDURE — 84520 ASSAY OF UREA NITROGEN: CPT

## 2019-03-07 PROCEDURE — 82310 ASSAY OF CALCIUM: CPT

## 2019-03-07 PROCEDURE — 84295 ASSAY OF SERUM SODIUM: CPT

## 2019-03-07 PROCEDURE — 36415 COLL VENOUS BLD VENIPUNCTURE: CPT

## 2019-03-07 PROCEDURE — 82565 ASSAY OF CREATININE: CPT

## 2019-03-07 PROCEDURE — 82306 VITAMIN D 25 HYDROXY: CPT

## 2019-03-07 PROCEDURE — 84075 ASSAY ALKALINE PHOSPHATASE: CPT

## 2019-03-07 PROCEDURE — 85025 COMPLETE CBC W/AUTO DIFF WBC: CPT

## 2019-03-07 PROCEDURE — 84460 ALANINE AMINO (ALT) (SGPT): CPT

## 2019-03-12 ENCOUNTER — HOSPITAL ENCOUNTER (OUTPATIENT)
Dept: HOSPITAL 89 - LAB | Age: 79
End: 2019-03-12
Attending: INTERNAL MEDICINE
Payer: MEDICARE

## 2019-03-12 VITALS — BODY MASS INDEX: 38.1 KG/M2

## 2019-03-12 DIAGNOSIS — R60.0: ICD-10-CM

## 2019-03-12 DIAGNOSIS — E11.40: ICD-10-CM

## 2019-03-12 DIAGNOSIS — I25.10: Primary | ICD-10-CM

## 2019-03-12 DIAGNOSIS — E78.2: ICD-10-CM

## 2019-03-12 DIAGNOSIS — I10: ICD-10-CM

## 2019-03-12 LAB — PLATELET COUNT, AUTOMATED: 158 K/UL (ref 150–450)

## 2019-03-12 PROCEDURE — 84295 ASSAY OF SERUM SODIUM: CPT

## 2019-03-12 PROCEDURE — 82310 ASSAY OF CALCIUM: CPT

## 2019-03-12 PROCEDURE — 82374 ASSAY BLOOD CARBON DIOXIDE: CPT

## 2019-03-12 PROCEDURE — 84075 ASSAY ALKALINE PHOSPHATASE: CPT

## 2019-03-12 PROCEDURE — 82247 BILIRUBIN TOTAL: CPT

## 2019-03-12 PROCEDURE — 36415 COLL VENOUS BLD VENIPUNCTURE: CPT

## 2019-03-12 PROCEDURE — 84100 ASSAY OF PHOSPHORUS: CPT

## 2019-03-12 PROCEDURE — 82435 ASSAY OF BLOOD CHLORIDE: CPT

## 2019-03-12 PROCEDURE — 83735 ASSAY OF MAGNESIUM: CPT

## 2019-03-12 PROCEDURE — 84155 ASSAY OF PROTEIN SERUM: CPT

## 2019-03-12 PROCEDURE — 82040 ASSAY OF SERUM ALBUMIN: CPT

## 2019-03-12 PROCEDURE — 84460 ALANINE AMINO (ALT) (SGPT): CPT

## 2019-03-12 PROCEDURE — 82565 ASSAY OF CREATININE: CPT

## 2019-03-12 PROCEDURE — 82947 ASSAY GLUCOSE BLOOD QUANT: CPT

## 2019-03-12 PROCEDURE — 84450 TRANSFERASE (AST) (SGOT): CPT

## 2019-03-12 PROCEDURE — 84132 ASSAY OF SERUM POTASSIUM: CPT

## 2019-03-12 PROCEDURE — 85025 COMPLETE CBC W/AUTO DIFF WBC: CPT

## 2019-03-12 PROCEDURE — 84520 ASSAY OF UREA NITROGEN: CPT

## 2019-04-08 ENCOUNTER — HOSPITAL ENCOUNTER (OUTPATIENT)
Dept: HOSPITAL 89 - ZZSENDIN | Age: 79
End: 2019-04-08
Attending: INTERNAL MEDICINE
Payer: MEDICARE

## 2019-04-08 VITALS — BODY MASS INDEX: 38.1 KG/M2

## 2019-04-08 DIAGNOSIS — E78.2: ICD-10-CM

## 2019-04-08 DIAGNOSIS — I10: ICD-10-CM

## 2019-04-08 DIAGNOSIS — I25.10: Primary | ICD-10-CM

## 2019-04-08 DIAGNOSIS — E11.40: ICD-10-CM

## 2019-04-08 DIAGNOSIS — R60.0: ICD-10-CM

## 2019-04-08 PROCEDURE — 87493 C DIFF AMPLIFIED PROBE: CPT

## 2019-04-08 PROCEDURE — 82274 ASSAY TEST FOR BLOOD FECAL: CPT

## 2019-04-24 ENCOUNTER — HOSPITAL ENCOUNTER (EMERGENCY)
Dept: HOSPITAL 89 - ER | Age: 79
Discharge: HOME | End: 2019-04-24
Payer: MEDICARE

## 2019-04-24 VITALS — DIASTOLIC BLOOD PRESSURE: 62 MMHG | SYSTOLIC BLOOD PRESSURE: 133 MMHG

## 2019-04-24 VITALS — WEIGHT: 258 LBS | BODY MASS INDEX: 38.1 KG/M2

## 2019-04-24 DIAGNOSIS — R10.11: Primary | ICD-10-CM

## 2019-04-24 DIAGNOSIS — K43.9: ICD-10-CM

## 2019-04-24 LAB
INR PPP: 0.98
PLATELET COUNT, AUTOMATED: 164 K/UL (ref 150–450)

## 2019-04-24 PROCEDURE — 82040 ASSAY OF SERUM ALBUMIN: CPT

## 2019-04-24 PROCEDURE — 84075 ASSAY ALKALINE PHOSPHATASE: CPT

## 2019-04-24 PROCEDURE — 74177 CT ABD & PELVIS W/CONTRAST: CPT

## 2019-04-24 PROCEDURE — 85610 PROTHROMBIN TIME: CPT

## 2019-04-24 PROCEDURE — 82374 ASSAY BLOOD CARBON DIOXIDE: CPT

## 2019-04-24 PROCEDURE — 82565 ASSAY OF CREATININE: CPT

## 2019-04-24 PROCEDURE — 96374 THER/PROPH/DIAG INJ IV PUSH: CPT

## 2019-04-24 PROCEDURE — 99284 EMERGENCY DEPT VISIT MOD MDM: CPT

## 2019-04-24 PROCEDURE — 82435 ASSAY OF BLOOD CHLORIDE: CPT

## 2019-04-24 PROCEDURE — 81001 URINALYSIS AUTO W/SCOPE: CPT

## 2019-04-24 PROCEDURE — 82310 ASSAY OF CALCIUM: CPT

## 2019-04-24 PROCEDURE — 84520 ASSAY OF UREA NITROGEN: CPT

## 2019-04-24 PROCEDURE — 85730 THROMBOPLASTIN TIME PARTIAL: CPT

## 2019-04-24 PROCEDURE — 83605 ASSAY OF LACTIC ACID: CPT

## 2019-04-24 PROCEDURE — 84295 ASSAY OF SERUM SODIUM: CPT

## 2019-04-24 PROCEDURE — 82247 BILIRUBIN TOTAL: CPT

## 2019-04-24 PROCEDURE — 84155 ASSAY OF PROTEIN SERUM: CPT

## 2019-04-24 PROCEDURE — 84460 ALANINE AMINO (ALT) (SGPT): CPT

## 2019-04-24 PROCEDURE — 82947 ASSAY GLUCOSE BLOOD QUANT: CPT

## 2019-04-24 PROCEDURE — 83690 ASSAY OF LIPASE: CPT

## 2019-04-24 PROCEDURE — 85025 COMPLETE CBC W/AUTO DIFF WBC: CPT

## 2019-04-24 PROCEDURE — 96375 TX/PRO/DX INJ NEW DRUG ADDON: CPT

## 2019-04-24 PROCEDURE — 96361 HYDRATE IV INFUSION ADD-ON: CPT

## 2019-04-24 PROCEDURE — 84450 TRANSFERASE (AST) (SGOT): CPT

## 2019-04-24 PROCEDURE — 84132 ASSAY OF SERUM POTASSIUM: CPT

## 2019-04-24 NOTE — RADIOLOGY IMAGING REPORT
FACILITY: [facility] 

 

PATIENT NAME: Krystian Franklin

: 1940

MR: [id number]

V: [id number]

EXAM DATE: 

ORDERING PHYSICIAN: GAYATRI SMITH

TECHNOLOGIST: 

 

Location: [facility]

Patient: Krystian Franklin

: 1940

MRN: [id number]

Visit/Account:[id number]

Date of Sevice:  2019

 

ACCESSION #: [id number]

 

CT ABDOMEN PELVIS W/ CON

 

HISTORY:  right sided abd pain

 

TECHNIQUE:  Axial images were obtained through the abdomen and pelvis with intravenous contrast . One
 of the following dose optimization techniques was utilized in the performance of this exam: automate
d exposure control; adjustment of the mA and/or kv according to patient size; or use of iterative rec
onstruction technique. Specific details can be referenced in the facility's radiology CT exam operati
onal policy.

 

CONTRAST:  75 cc of Isovue-370

 

COMPARISON:  CT abdomen/pelvis 2019

 

FINDINGS:

 

Visualized lung bases:  Negative.

Hepatobiliary:  Cholecystectomy. No biliary ductal dilatation.

Spleen:  Granulomas

Adrenals:  Negative.

Pancreas:  Negative.

Kidneys/ureters/bladder:  Stable mild bilateral perinephric stranding. No hydronephrosis.

Bowel/peritoneum/mesentery:  Prior pneumatosis, portal venous gas and free intraperitoneal air have r
esolved. Small right pelvic ventral hernia containing a knuckle of nonobstructed small bowel at a sma
ll bowel anastomosis. No bowel obstruction.

Vessels:  Mild arterial calcifications.

Lymph nodes: Negative.

Pelvic genitourinary: Negative.

Bones/body wall:  There are 2 additional ventral abdominal wall hernias to the right and left of midl
ine containing fat measuring up to 7.2 x 3.8 x 4.9 cm.  Multilevel degenerative disc disease througho
ut the spine. Vertebral body hemangioma within L5. Superior and inferior endplate Schmorl's nodes at 
L2, unchanged multilevel vacuum disc.

Other findings: None significant

 

IMPRESSION:

 

1. No acute inflammatory process within the abdomen or pelvis.

2. There are 3 separate ventral abdominal/pelvic hernias containing fat and a nonobstructed knuckle o
f small bowel.

 

Report Dictated By: Jacinto Soria MD at 2019 12:07 PM

 

Report E-Signed By: Jacinto Soria MD  at 2019 12:16 PM

 

WSN:[id number]

## 2019-04-24 NOTE — ER REPORT
History and Physical


Time Seen By MD:  11:04


HPI/ROS


CHIEF COMPLAINT: Diffuse abdominal pain





HISTORY OF PRESENT ILLNESS: Patient is a 78-year-old male here with recurrent 


ongoing persistent abdominal pain which acutely worsened this morning, worse in 


the right upper quadrant. Patient is afebrile at time of evaluation, denies vo


miting, blood in the stools or urine, recent infections, lightheadedness.





REVIEW OF SYSTEMS:


Constitutional: No fever, no chills.


Eyes: No discharge.


ENT: No sore throat. 


Cardiovascular: No chest pain, no palpitations.


Respiratory: No cough, no shortness of breath.


Gastrointestinal: + Diffuse abdominal pain, no vomiting, + nausea.


Genitourinary: No hematuria.


Musculoskeletal: No back pain.


Skin: No rashes.


Neurological: No headache.


Allergies:  


Coded Allergies:  


     Penicillins (Verified  Allergy, Severe, HIVES, 16)


     Quinolones (Verified  Allergy, Severe, TENDONITIS, 16)


     fentanyl (Verified  Allergy, Severe, SHORTNESS OF BREATH, 19)


     levofloxacin (Verified  Allergy, Severe,  , 16)


   


   TENDONITIS


     adhesive (Verified  Allergy, Mild, RASH, 16)


     cefaclor (Verified  Adverse Reaction, Mild, ITCHING, 16)


     morphine (Verified  Adverse Reaction, Mild, SEDATION, 16)


Home Meds


Active Scripts


Syringe & Needle,Insulin,1 Ml (INSULIN SYRINGE) 1 Each Disp.syrin, 1 EACH MC 


R02auor, #300 4 Refills


   use 3 syringes to inject insulin 3 times daily.


   Prov:MALDONADO CHUN MD         19


Trazodone Hcl (TRAZODONE HCL) 50 Mg Tablet, 0.5-1 TAB PO QHS PRN for diff 


sleeping, #30 TAB 3 Refills


   Prov:MALDONADO CHUN MD         19


Needles, Insulin Disposable (Bd Ultra-Fine Pen Needle) 1 Each Dis.needle, BOX 


TOP AS DIRECTED, #1 5 Refills


   Prov:MALDONADO CHUN MD         3/19/19


Potassium Chloride (KLOR-CON M20) 20 Meq Tab.er.prt, 40 MEQ PO AS DIRECTED, #360


TAB 3 Refills


   Take 40 mEq in the morning and 20 meq in the evening


   Prov:MALDONADO CHUN MD         3/15/19


Fluticasone Prop 50 Mcg Ns (FLONASE 50 MCG NS) 16 Gm Spray.susp, 2 SPRAYS NS 


QDAY, #1 BOT 2 Refills


   Prov:MALDONADO CHUN MD         3/13/19


Clobetasol Propionate (CLOBETASOL PROPIONATE) 15 Gm Oint...g., 1 SINDHU TP BID, #1 


TUBE 1 Refill


   Prov:MALDONADO CHUN MD         3/13/19


Insulin Regular, Human (HUMULIN R) 100 Unit/1 Ml Vial, 100 UNIT SQ AS DIRECTED, 


#3 VIAL 5 Refills


   sliding scale 3-10 units max/24 / hours


   Prov:MALDONADO CHUN MD         3/12/19


Furosemide (FUROSEMIDE) 40 Mg Tablet, 1 TAB PO QDAY, #90 TAB 2 Refills


   Prov:MALDONADO CHUN MD         3/12/19


Prednisone (PREDNISONE) 1 Mg Tab, 2 MG PO QDAY, #180 TAB 3 Refills


   Prov:MALDONADO CHUN MD         3/12/19


Glipizide (GLIPIZIDE) 10 Mg Tablet, 10 MG PO BID, #180 TAB 3 Refills


   Prov:MALDONADO CHUN MD         3/12/19


Atorvastatin Calcium (LIPITOR) 40 Mg Tablet, 1 TAB PO QDAY, #90 TAB 4 Refills


   Prov:MALDONADO CHUN MD         3/12/19


Pantoprazole Sodium (PANTOPRAZOLE SODIUM) 40 Mg Tablet.dr, 1 TAB PO QDAY, #90 


TAB.SR 3 Refills


   Prov:MALDONADO CHUN MD         3/12/19


Lisinopril (LISINOPRIL) 10 Mg Tablet, 10 MG PO QDAY, #90 TAB 3 Refills


   Prov:MALDONADO CHUN MD         3/12/19


Nph, Human Insulin Isophane (NOVOLIN N) 100 Unit/1 Ml Vial, 23 UNIT SQ BIDAC, #5


VIAL 5 Refills


   Prov:MALDONADO CHUN MD         3/12/19


Leflunomide (LEFLUNOMIDE) 20 Mg Tablet, 1 TAB PO QDAY, #90 TAB 3 Refills


   Prov:MALDONADO CHUN MD         3/12/19


Albuterol Sulfate (VENTOLIN HFA) 18 Gm Inh, 2 PUFF INH Q4-6H PRN for shortness 


of breath, #3 INH 3 Refills


   Prov:HUBER SWANSON MD         1/10/19


Albuterol Sulfate 0.083% (ALBUTEROL SULFATE 0.083%) 2.5 Mg/3 Ml Vial.neb, 2.5 MG


INH Q6H for wheezing/cough, #1 BOX 3 Refills


   Prov:HUBER SWANSON MD         18


Blood Sugar Diagnostic, Drum (Accu-Chek Compact Plus) 1 Each Strip, 300 EA MC 


every ninety days, #300 EA 4 Refills


   Use to test blood suger TID


   Prov:HUBER SWANSON MD         18


Reported Medications


Ergocalciferol (Vitamin D2) (VITAMIN D2) 50,000 Unit Capsule, 64484 UNIT PO 


QWEEK, CAPSULE


   3/7/19


Aspirin (ASPIR 81) 81 Mg Tablet.dr, 1 TAB PO QDAY, TAB


   3/7/19


Loperamide Hcl (LOPERAMIDE) 2 Mg Tablet, 2 MG PO


   19


Oxygen (OXYGEN)  Inha, 2 L INH HS, L


   18


Magnesium Oxide (MAGNESIUM) 400 Mg Capsule, 1 CAP PO QDAY, CAPSULE


   7/30/15


Hx Smoking:  Yes (QUIT )


Smoking Status:  Former Smoker


Exposure to Second Hand Smoke?:  No


Hx Substance Use Disorder:  No


Hx Alcohol Use:  No


Constitutional





Physical Exam


   General Appearance: The patient is alert, has no immediate need for airway 


protection and no signs of toxicity. Uncomfortable appearing


Eyes: Pupils equal and round no pallor or injection.


ENT, Mouth: Mucous membranes are moist.


Respiratory: There are no retractions, lungs are clear to auscultation.


Cardiovascular: Regular rate and rhythm. 


Gastrointestinal:  + Diffuse tenderness on palpation, worse tenderness in the 


right upper quadrant, no rebound or guarding


Neurological: No focal neurological findings, alert and oriented


Skin: Warm and dry, no rashes.


Musculoskeletal:  Neck is supple non tender.


      Extremities are nontender, nonswollen and have full range of motion.





DIFFERENTIAL DIAGNOSIS: After history and physical exam differential diagnosis 


was considered for abdominal pain including but not limited to appendicitis, 


cholecystitis, gastritis and urinary tract infection.





Medical Decision Making


Data Points


Laboratory





Hematology








Test


 19


11:14 19


12:14


 


Red Blood Count


 4.95 M/uL


(4.00-5.60) 





 


Mean Corpuscular Volume


 91.0 fL


(80.0-96.0) 





 


Mean Corpuscular Hemoglobin


 29.5 pg


(26.0-33.0) 





 


Mean Corpuscular Hemoglobin


Concent 32.5 g/dL


(32.0-36.0) 





 


Red Cell Distribution Width


 15.8 %


(11.5-14.5) 





 


Mean Platelet Volume


 10.0 fL


(7.2-11.1) 





 


Neutrophils (%) (Auto)


 69.5 %


(39.4-72.5) 





 


Lymphocytes (%) (Auto)


 13.2 %


(17.6-49.6) 





 


Monocytes (%) (Auto)


 11.3 %


(4.1-12.4) 





 


Eosinophils (%) (Auto)


 5.5 %


(0.4-6.7) 





 


Basophils (%) (Auto)


 0.5 %


(0.3-1.4) 





 


Nucleated RBC Relative Count


(auto) 0.0 /100WBC 


 





 


Neutrophils # (Auto)


 4.9 K/uL


(2.0-7.4) 





 


Lymphocytes # (Auto)


 0.9 K/uL


(1.3-3.6) 





 


Monocytes # (Auto)


 0.8 K/uL


(0.3-1.0) 





 


Eosinophils # (Auto)


 0.4 K/uL


(0.0-0.5) 





 


Basophils # (Auto)


 0.0 K/uL


(0.0-0.1) 





 


Nucleated RBC Absolute Count


(auto) 0.00 K/uL 


 





 


Prothrombin Time


 12.9 seconds


(12.0-14.4) 





 


Prothromb Time International


Ratio 0.98 


 





 


Activated Partial


Thromboplast Time 33 seconds


(23-35) 





 


Sodium Level


 139 mmol/L


(137-145) 





 


Potassium Level


 4.1 mmol/L


(3.5-5.0) 





 


Chloride Level


 104 mmol/L


() 





 


Carbon Dioxide Level


 31 mmol/L


(22-30) 





 


Blood Urea Nitrogen 9 mg/dl (9-21)  


 


Creatinine


 0.70 mg/dl


(0.66-1.25) 





 


Glomerular Filtration Rate


Calc > 60.0 


 





 


Random Glucose


 95 mg/dl


() 





 


Lactate


 1.3 mmol/L


(0.7-2.1) 





 


Calcium Level


 8.8 mg/dl


(8.4-10.2) 





 


Total Bilirubin


 0.7 mg/dl


(0.2-1.3) 





 


Aspartate Amino Transf


(AST/SGOT) 28 U/L (0-35) 


 





 


Alanine Aminotransferase


(ALT/SGPT) 34 U/L (0-56) 


 





 


Alkaline Phosphatase 67 U/L (0-126)  


 


Total Protein


 6.4 g/dl


(6.3-8.2) 





 


Albumin


 3.3 g/dl


(3.5-5.0) 





 


Lipase


 27 U/L


() 





 


Urine Color  Yellow 


 


Urine Clarity  Clear 


 


Urine pH


 


 7.0 pH


(4.8-9.5)


 


Urine Specific Gravity  1.005 


 


Urine Protein


 


 Negative mg/dL


(NEGATIVE)


 


Urine Glucose (UA)


 


 Negative mg/dL


(NEGATIVE)


 


Urine Ketones


 


 Negative mg/dL


(NEGATIVE)


 


Urine Blood


 


 Negative


(NEGATIVE)


 


Urine Nitrite


 


 Negative


(NEGATIVE)


 


Urine Bilirubin


 


 Negative


(NEGATIVE)


 


Urine Urobilinogen


 


 Negative mg/dL


(0.2-1.9)


 


Urine Leukocyte Esterase


 


 Negative


(NEGATIVE)


 


Urine RBC


 


 None /HPF


(0-2/HPF)


 


Urine WBC


 


 4 /HPF


(0-5/HPF)


 


Urine Squamous Epithelial


Cells 


 Many /LPF


(</=FEW)


 


Urine Bacteria


 


 Negative /HPF


(NONE-FEW)


 


Urine Mucus


 


 None /HPF


(NONE-FEW)








Chemistry








Test


 19


11:14 19


12:14


 


White Blood Count


 7.1 k/uL


(4.5-11.0) 





 


Red Blood Count


 4.95 M/uL


(4.00-5.60) 





 


Hemoglobin


 14.6 g/dL


(14.0-18.0) 





 


Hematocrit


 45.0 %


(42.0-52.0) 





 


Mean Corpuscular Volume


 91.0 fL


(80.0-96.0) 





 


Mean Corpuscular Hemoglobin


 29.5 pg


(26.0-33.0) 





 


Mean Corpuscular Hemoglobin


Concent 32.5 g/dL


(32.0-36.0) 





 


Red Cell Distribution Width


 15.8 %


(11.5-14.5) 





 


Platelet Count


 164 K/uL


(150-450) 





 


Mean Platelet Volume


 10.0 fL


(7.2-11.1) 





 


Neutrophils (%) (Auto)


 69.5 %


(39.4-72.5) 





 


Lymphocytes (%) (Auto)


 13.2 %


(17.6-49.6) 





 


Monocytes (%) (Auto)


 11.3 %


(4.1-12.4) 





 


Eosinophils (%) (Auto)


 5.5 %


(0.4-6.7) 





 


Basophils (%) (Auto)


 0.5 %


(0.3-1.4) 





 


Nucleated RBC Relative Count


(auto) 0.0 /100WBC 


 





 


Neutrophils # (Auto)


 4.9 K/uL


(2.0-7.4) 





 


Lymphocytes # (Auto)


 0.9 K/uL


(1.3-3.6) 





 


Monocytes # (Auto)


 0.8 K/uL


(0.3-1.0) 





 


Eosinophils # (Auto)


 0.4 K/uL


(0.0-0.5) 





 


Basophils # (Auto)


 0.0 K/uL


(0.0-0.1) 





 


Nucleated RBC Absolute Count


(auto) 0.00 K/uL 


 





 


Prothrombin Time


 12.9 seconds


(12.0-14.4) 





 


Prothromb Time International


Ratio 0.98 


 





 


Activated Partial


Thromboplast Time 33 seconds


(23-35) 





 


Glomerular Filtration Rate


Calc > 60.0 


 





 


Lactate


 1.3 mmol/L


(0.7-2.1) 





 


Calcium Level


 8.8 mg/dl


(8.4-10.2) 





 


Total Bilirubin


 0.7 mg/dl


(0.2-1.3) 





 


Aspartate Amino Transf


(AST/SGOT) 28 U/L (0-35) 


 





 


Alanine Aminotransferase


(ALT/SGPT) 34 U/L (0-56) 


 





 


Alkaline Phosphatase 67 U/L (0-126)  


 


Total Protein


 6.4 g/dl


(6.3-8.2) 





 


Albumin


 3.3 g/dl


(3.5-5.0) 





 


Lipase


 27 U/L


() 





 


Urine Color  Yellow 


 


Urine Clarity  Clear 


 


Urine pH


 


 7.0 pH


(4.8-9.5)


 


Urine Specific Gravity  1.005 


 


Urine Protein


 


 Negative mg/dL


(NEGATIVE)


 


Urine Glucose (UA)


 


 Negative mg/dL


(NEGATIVE)


 


Urine Ketones


 


 Negative mg/dL


(NEGATIVE)


 


Urine Blood


 


 Negative


(NEGATIVE)


 


Urine Nitrite


 


 Negative


(NEGATIVE)


 


Urine Bilirubin


 


 Negative


(NEGATIVE)


 


Urine Urobilinogen


 


 Negative mg/dL


(0.2-1.9)


 


Urine Leukocyte Esterase


 


 Negative


(NEGATIVE)


 


Urine RBC


 


 None /HPF


(0-2/HPF)


 


Urine WBC


 


 4 /HPF


(0-5/HPF)


 


Urine Squamous Epithelial


Cells 


 Many /LPF


(</=FEW)


 


Urine Bacteria


 


 Negative /HPF


(NONE-FEW)


 


Urine Mucus


 


 None /HPF


(NONE-FEW)








Coagulation








Test


 19


11:14


 


Prothrombin Time 12.9 seconds 


 


Prothromb Time International


Ratio 0.98 





 


Activated Partial


Thromboplast Time 33 seconds 











Urinalysis








Test


 19


12:14


 


Urine Color Yellow 


 


Urine Clarity Clear 


 


Urine pH


 7.0 pH


(4.8-9.5)


 


Urine Specific Gravity 1.005 


 


Urine Protein


 Negative mg/dL


(NEGATIVE)


 


Urine Glucose (UA)


 Negative mg/dL


(NEGATIVE)


 


Urine Ketones


 Negative mg/dL


(NEGATIVE)


 


Urine Blood


 Negative


(NEGATIVE)


 


Urine Nitrite


 Negative


(NEGATIVE)


 


Urine Bilirubin


 Negative


(NEGATIVE)


 


Urine Urobilinogen


 Negative mg/dL


(0.2-1.9)


 


Urine Leukocyte Esterase


 Negative


(NEGATIVE)


 


Urine RBC


 None /HPF


(0-2/HPF)


 


Urine WBC


 4 /HPF


(0-5/HPF)


 


Urine Squamous Epithelial


Cells Many /LPF


(</=FEW)


 


Urine Bacteria


 Negative /HPF


(NONE-FEW)


 


Urine Mucus


 None /HPF


(NONE-FEW)











EKG/Imaging


Imaging


PATIENT NAME: Krystian Franklin


: 1940


MR: [id number]


V: [id number]


EXAM DATE: 


ORDERING PHYSICIAN: GAYATRI SMITH


TECHNOLOGIST: 


 


Location: [facility]


Patient: Krystian Franklin


: 1940


MRN: [id number]


Visit/Account:[id number]


Date of Sevice:  2019


 


ACCESSION #: [id number]


 


CT ABDOMEN PELVIS W/ CON


 


HISTORY:  right sided abd pain


 


TECHNIQUE:  Axial images were obtained through the abdomen and pelvis with 


intravenous contrast . One of the following dose optimization techniques was 


utilized in the performance of this exam: automated exposure control; adjustment


of the mA and/or kv according to patient size; or use of iterative 


reconstruction technique. Specific details can be referenced in the facility's 


radiology CT exam operational policy.


 


CONTRAST:  75 cc of Isovue-370


 


COMPARISON:  CT abdomen/pelvis 2019


 


FINDINGS:


 


Visualized lung bases:  Negative.


Hepatobiliary:  Cholecystectomy. No biliary ductal dilatation.


Spleen:  Granulomas


Adrenals:  Negative.


Pancreas:  Negative.


Kidneys/ureters/bladder:  Stable mild bilateral perinephric stranding. No 


hydronephrosis.


Bowel/peritoneum/mesentery:  Prior pneumatosis, portal venous gas and free 


intraperitoneal air have resolved. Small right pelvic ventral hernia containing 


a knuckle of nonobstructed small bowel at a small bowel anastomosis. No bowel 


obstruction.


Vessels:  Mild arterial calcifications.


Lymph nodes: Negative.


Pelvic genitourinary: Negative.


Bones/body wall:  There are 2 additional ventral abdominal wall hernias to the 


right and left of midline containing fat measuring up to 7.2 x 3.8 x 4.9 cm.  


Multilevel degenerative disc disease throughout the spine. Vertebral body 


hemangioma within L5. Superior and inferior endplate Schmorl's nodes at L2, unc


hanged multilevel vacuum disc.


Other findings: None significant


 


IMPRESSION:


 


1. No acute inflammatory process within the abdomen or pelvis.


2. There are 3 separate ventral abdominal/pelvic hernias containing fat and a 


nonobstructed knuckle of small bowel.





ED Course/Re-evaluation


ED Course


Patient is a 78-year-old male here with complaints of recurrent abdominal pain, 


moderate improvement in pain since arrival in the emergency department. CT of 


the abdomen and pelvis was positive for abdominal hernias however there are no 


acute findings present. Labs are unremarkable with no leukocytosis, lactate was 


negative. Patient was given Reglan, fentanyl at which time the patient developed


difficulty breathing and was given Narcan with resolution of discomfort. I 


updated the patient regarding these findings and recommended close PCP follow-


up. Return precautions provided.


Decision to Disposition Date:  2019


Decision to Disposition Time:  12:57





Depart


Departure


Latest Vital Signs





Impression:  


   Primary Impression:  


   Abdominal pain


Condition:  Improved


Disposition:  HOME OR SELF-CARE


Referrals:  


MALDONADO CHUN MD (PCP)


Patient Instructions:  Abdominal Pain (ED)





Additional Instructions:  


Please drink plenty of water. Please return promptly if you develop fevers, 


recurrent abdominal pain, inability keep down food or fluids. Please follow-up 


with your primary care provider in the next 24-48 hours.











GAYATRI SMITH DO           2019 11:04